# Patient Record
Sex: MALE | Race: WHITE | NOT HISPANIC OR LATINO | Employment: FULL TIME | URBAN - METROPOLITAN AREA
[De-identification: names, ages, dates, MRNs, and addresses within clinical notes are randomized per-mention and may not be internally consistent; named-entity substitution may affect disease eponyms.]

---

## 2019-08-04 ENCOUNTER — HOSPITAL ENCOUNTER (EMERGENCY)
Facility: HOSPITAL | Age: 24
Discharge: HOME | End: 2019-08-04
Attending: EMERGENCY MEDICINE

## 2019-08-04 VITALS
HEART RATE: 58 BPM | WEIGHT: 135 LBS | DIASTOLIC BLOOD PRESSURE: 86 MMHG | TEMPERATURE: 98 F | HEIGHT: 69 IN | RESPIRATION RATE: 20 BRPM | SYSTOLIC BLOOD PRESSURE: 123 MMHG | BODY MASS INDEX: 19.99 KG/M2 | OXYGEN SATURATION: 100 %

## 2019-08-04 DIAGNOSIS — K04.01 TOOTH PULPITIS: Primary | ICD-10-CM

## 2019-08-04 PROCEDURE — 25000003 PHARM REV CODE 250: Performed by: NURSE PRACTITIONER

## 2019-08-04 PROCEDURE — 99284 EMERGENCY DEPT VISIT MOD MDM: CPT

## 2019-08-04 RX ORDER — IBUPROFEN 800 MG/1
800 TABLET ORAL EVERY 8 HOURS PRN
Qty: 20 TABLET | Refills: 0 | Status: SHIPPED | OUTPATIENT
Start: 2019-08-04

## 2019-08-04 RX ORDER — HYDROCODONE BITARTRATE AND ACETAMINOPHEN 5; 325 MG/1; MG/1
1 TABLET ORAL
Status: COMPLETED | OUTPATIENT
Start: 2019-08-04 | End: 2019-08-04

## 2019-08-04 RX ORDER — CLINDAMYCIN HYDROCHLORIDE 150 MG/1
300 CAPSULE ORAL 4 TIMES DAILY
Qty: 56 CAPSULE | Refills: 0 | Status: SHIPPED | OUTPATIENT
Start: 2019-08-04 | End: 2019-08-11

## 2019-08-04 RX ADMIN — HYDROCODONE BITARTRATE AND ACETAMINOPHEN 1 TABLET: 5; 325 TABLET ORAL at 12:08

## 2019-08-04 NOTE — DISCHARGE INSTRUCTIONS
Take all antibiotics as prescribed. Follow up with dentist as soon as possible. Return to ED for new or worsening symptoms.

## 2019-08-04 NOTE — ED PROVIDER NOTES
Encounter Date: 8/4/2019    SCRIBE #1 NOTE: I, Skinny Galeasbonnie, am scribing for, and in the presence of,  Billie Mckeon NP. I have scribed the entire note.       History     Chief Complaint   Patient presents with    Dental Pain     Time patient was seen by the provider: 12:31 PM      The patient is a 24 y.o. male with no known co-morbidities, who presents to the ED with a complaint of dental pain. Patient reports that he has had a toothache for the past couple months which just yesterday became more severe prompting him to present to the ED. Patient reports he has been meaning to get in and see his dentist but has been unable and cannot bear the pain. NKDA. Patient endorses smoking and the occasional use of Marijuana. Patient denies any other acute symptoms at this time.          Review of patient's allergies indicates:  No Known Allergies  History reviewed. No pertinent past medical history.  Past Surgical History:   Procedure Laterality Date    TONSILLECTOMY       History reviewed. No pertinent family history.  Social History     Tobacco Use    Smoking status: Current Every Day Smoker     Packs/day: 1.00     Types: Cigarettes    Smokeless tobacco: Never Used   Substance Use Topics    Alcohol use: Never     Frequency: Never    Drug use: Yes     Types: Marijuana     Review of Systems   Constitutional: Negative for activity change, appetite change, chills and fever.   HENT: Positive for dental problem. Negative for congestion, drooling, ear pain, facial swelling, rhinorrhea, sinus pressure, sinus pain, sneezing, sore throat and voice change.    Eyes: Negative for pain, discharge and redness.   Respiratory: Negative for cough, shortness of breath, wheezing and stridor.    Cardiovascular: Negative for chest pain, palpitations and leg swelling.   Gastrointestinal: Negative for abdominal distention, abdominal pain, blood in stool, constipation, diarrhea, nausea and vomiting.   Genitourinary: Negative for difficulty  urinating, dysuria, flank pain, frequency, hematuria and urgency.   Musculoskeletal: Negative for arthralgias, gait problem, joint swelling and myalgias.   Skin: Negative for rash and wound.   Neurological: Negative for dizziness, syncope, facial asymmetry, speech difficulty, weakness, light-headedness, numbness and headaches.   Hematological: Negative for adenopathy.   Psychiatric/Behavioral: Negative.    All other systems reviewed and are negative.      Physical Exam     Initial Vitals [08/04/19 1222]   BP Pulse Resp Temp SpO2   123/86 (!) 58 20 97.6 °F (36.4 °C) 100 %      MAP       --         Physical Exam    Nursing note and vitals reviewed.  Constitutional: He appears well-developed and well-nourished. He is not diaphoretic. He is active. No distress.   HENT:   Head: Normocephalic and atraumatic.   Right Ear: External ear normal.   Left Ear: External ear normal.   Nose: Nose normal.   Mouth/Throat: Uvula is midline, oropharynx is clear and moist and mucous membranes are normal. No oral lesions. Abnormal dentition. Dental caries present. No dental abscesses. No oropharyngeal exudate.   No palpable dental abscess.    Eyes: Conjunctivae, EOM and lids are normal. Pupils are equal, round, and reactive to light. Right eye exhibits no chemosis and no discharge. Left eye exhibits no chemosis and no discharge. Right conjunctiva is not injected. Left conjunctiva is not injected.   Neck: Trachea normal and normal range of motion. Neck supple. No stridor present. No tracheal deviation present. No neck rigidity.   Cardiovascular: Normal rate, regular rhythm, normal heart sounds and normal pulses. Exam reveals no distant heart sounds and no friction rub.    No murmur heard.  Pulmonary/Chest: Breath sounds normal. No stridor. He has no wheezes. He has no rhonchi. He has no rales.   Musculoskeletal: Normal range of motion.   Neurological: He is alert and oriented to person, place, and time. He has normal strength. GCS eye  subscore is 4. GCS verbal subscore is 5. GCS motor subscore is 6.   Skin: Skin is warm, dry and intact. Capillary refill takes less than 2 seconds. No rash noted.   Psychiatric: He has a normal mood and affect. His speech is normal and behavior is normal. Thought content normal.         ED Course   Procedures  Labs Reviewed - No data to display       Imaging Results    None          Medical Decision Making:   History:   Old Medical Records: I decided to obtain old medical records.  Differential Diagnosis:   Pulpitis  Abscess  Facial cellulitis  parotiditis        APC / Resident Notes:   The patient appears to have pulpitis.  Based upon the history and physical I see no signs of Samson's angina, sublingual swelling, facial swelling, airway compromise, facial cellulitis, sepsis, dehydration, or a fluctuant abscess to drain.  I believe the patient can be discharged home with antibiotics  And anti-inflammatories.  The patient has been given specific return precautions and instructed to follow up with a dentist. I have discussed pt with Dr Mcmillan who agrees with poc.Pt  voices understanding and is agreeable to the plan.  He is given specific return precautions.            Scribe Attestation:   Scribe #1: I performed the above scribed service and the documentation accurately describes the services I performed. I attest to the accuracy of the note.    I, WILEY Park, personally performed the services described in this documentation. All medical record entries made by the scribe were at my direction and in my presence.  I have reviewed the chart and agree that the record reflects my personal performance and is accurate and complete. WILEY Park.  2:52 PM 08/04/2019             Clinical Impression:       ICD-10-CM ICD-9-CM   1. Tooth pulpitis K04.01 522.0         Disposition:   Disposition: Discharged  Condition: Stable                        Billie Mckeon NP  08/04/19 1556

## 2022-06-13 ENCOUNTER — HOSPITAL ENCOUNTER (EMERGENCY)
Facility: HOSPITAL | Age: 27
Discharge: HOME | End: 2022-06-13
Attending: EMERGENCY MEDICINE
Payer: MEDICAID

## 2022-06-13 VITALS
TEMPERATURE: 98 F | SYSTOLIC BLOOD PRESSURE: 117 MMHG | DIASTOLIC BLOOD PRESSURE: 56 MMHG | BODY MASS INDEX: 19.26 KG/M2 | RESPIRATION RATE: 18 BRPM | HEIGHT: 69 IN | WEIGHT: 130 LBS | HEART RATE: 62 BPM | OXYGEN SATURATION: 99 %

## 2022-06-13 DIAGNOSIS — F11.93 OPIATE WITHDRAWAL: Primary | ICD-10-CM

## 2022-06-13 PROCEDURE — 96374 THER/PROPH/DIAG INJ IV PUSH: CPT

## 2022-06-13 PROCEDURE — 96361 HYDRATE IV INFUSION ADD-ON: CPT

## 2022-06-13 PROCEDURE — 25000003 PHARM REV CODE 250: Performed by: EMERGENCY MEDICINE

## 2022-06-13 PROCEDURE — 96376 TX/PRO/DX INJ SAME DRUG ADON: CPT

## 2022-06-13 PROCEDURE — 63600175 PHARM REV CODE 636 W HCPCS: Performed by: EMERGENCY MEDICINE

## 2022-06-13 PROCEDURE — 96375 TX/PRO/DX INJ NEW DRUG ADDON: CPT

## 2022-06-13 PROCEDURE — 99284 EMERGENCY DEPT VISIT MOD MDM: CPT | Mod: 25

## 2022-06-13 RX ORDER — CLONIDINE HYDROCHLORIDE 0.1 MG/1
0.1 TABLET ORAL
Status: COMPLETED | OUTPATIENT
Start: 2022-06-13 | End: 2022-06-13

## 2022-06-13 RX ORDER — PAROXETINE HYDROCHLORIDE 40 MG/1
40 TABLET, FILM COATED ORAL EVERY MORNING
COMMUNITY
Start: 2022-04-13

## 2022-06-13 RX ORDER — DIAZEPAM 10 MG/2ML
5 INJECTION INTRAMUSCULAR
Status: COMPLETED | OUTPATIENT
Start: 2022-06-13 | End: 2022-06-13

## 2022-06-13 RX ORDER — CLONIDINE HYDROCHLORIDE 0.1 MG/1
0.1 TABLET ORAL 2 TIMES DAILY
Qty: 10 TABLET | Refills: 0 | Status: SHIPPED | OUTPATIENT
Start: 2022-06-13 | End: 2023-07-14 | Stop reason: SDUPTHER

## 2022-06-13 RX ORDER — SODIUM CHLORIDE 9 MG/ML
INJECTION, SOLUTION INTRAVENOUS
Status: COMPLETED | OUTPATIENT
Start: 2022-06-13 | End: 2022-06-13

## 2022-06-13 RX ORDER — CLONAZEPAM 2 MG/1
2 TABLET ORAL 3 TIMES DAILY PRN
Qty: 15 TABLET | Refills: 0 | Status: SHIPPED | OUTPATIENT
Start: 2022-06-13

## 2022-06-13 RX ORDER — KETOROLAC TROMETHAMINE 30 MG/ML
30 INJECTION, SOLUTION INTRAMUSCULAR; INTRAVENOUS
Status: COMPLETED | OUTPATIENT
Start: 2022-06-13 | End: 2022-06-13

## 2022-06-13 RX ORDER — CLONAZEPAM 0.5 MG/1
2 TABLET ORAL
Status: COMPLETED | OUTPATIENT
Start: 2022-06-13 | End: 2022-06-13

## 2022-06-13 RX ADMIN — SODIUM CHLORIDE: 0.9 INJECTION, SOLUTION INTRAVENOUS at 05:06

## 2022-06-13 RX ADMIN — DIAZEPAM 5 MG: 5 INJECTION, SOLUTION INTRAMUSCULAR; INTRAVENOUS at 06:06

## 2022-06-13 RX ADMIN — CLONIDINE HYDROCHLORIDE 0.1 MG: 0.1 TABLET ORAL at 05:06

## 2022-06-13 RX ADMIN — DIAZEPAM 5 MG: 5 INJECTION, SOLUTION INTRAMUSCULAR; INTRAVENOUS at 07:06

## 2022-06-13 RX ADMIN — CLONAZEPAM 2 MG: 0.5 TABLET ORAL at 05:06

## 2022-06-13 RX ADMIN — KETOROLAC TROMETHAMINE 30 MG: 30 INJECTION, SOLUTION INTRAMUSCULAR at 06:06

## 2022-06-13 NOTE — ED PROVIDER NOTES
Encounter Date: 6/13/2022       History     Chief Complaint   Patient presents with    Withdrawal     Body aches and can't sleep     Chief complaint:  Withdrawn    HPI:  27-year-old male who reports daily oxycodone usage presents with withdrawal.  He describes feeling anxious and jittery, muscle aches.  He reports that he snorts oxycodone.  He denies any intravenous use.  He has no headache or neck stiffness.  No chest pain or shortness of breath but does have palpitations.  He has no significant past medical history.        Review of patient's allergies indicates:  No Known Allergies  History reviewed. No pertinent past medical history.  Past Surgical History:   Procedure Laterality Date    TONSILLECTOMY       History reviewed. No pertinent family history.  Social History     Tobacco Use    Smoking status: Current Every Day Smoker     Packs/day: 1.00     Types: Cigarettes    Smokeless tobacco: Never Used   Substance Use Topics    Alcohol use: Never    Drug use: Yes     Types: Marijuana     Comment: danilo     Review of Systems   Constitutional: Negative for activity change, appetite change, chills, fatigue and fever.   Eyes: Negative for visual disturbance.   Respiratory: Negative for apnea and shortness of breath.    Cardiovascular: Positive for palpitations. Negative for chest pain.   Gastrointestinal: Negative for abdominal distention and abdominal pain.   Genitourinary: Negative for difficulty urinating.   Musculoskeletal: Positive for myalgias. Negative for neck pain.   Skin: Negative for pallor and rash.   Neurological: Negative for headaches.   Hematological: Does not bruise/bleed easily.   Psychiatric/Behavioral: Positive for agitation and dysphoric mood. Negative for suicidal ideas. The patient is nervous/anxious and is hyperactive.        Physical Exam     Initial Vitals [06/13/22 0506]   BP Pulse Resp Temp SpO2   (!) 141/86 98 18 97.7 °F (36.5 °C) 98 %      MAP       --         Physical  Exam    Nursing note and vitals reviewed.  Constitutional: He appears well-developed and well-nourished.   HENT:   Head: Normocephalic and atraumatic.   Eyes: Conjunctivae are normal.   Neck: Neck supple.   Normal range of motion.  Cardiovascular: Regular rhythm and normal heart sounds. Exam reveals no gallop and no friction rub.    No murmur heard.  Tachycardic rate   Pulmonary/Chest: Breath sounds normal. No respiratory distress. He has no wheezes. He has no rhonchi. He has no rales.   Abdominal: Abdomen is soft. He exhibits no distension. There is no abdominal tenderness.   Musculoskeletal:         General: Normal range of motion.      Cervical back: Normal range of motion and neck supple.     Neurological: He is alert and oriented to person, place, and time.   Skin: Skin is warm and dry.   Psychiatric:   Anxious with constant movements         ED Course   Procedures  Labs Reviewed - No data to display       Imaging Results    None          Medications   0.9%  NaCl infusion (0 mL/hr Intravenous Stopped 6/13/22 0624)   cloNIDine tablet 0.1 mg (0.1 mg Oral Given 6/13/22 0549)   clonazePAM tablet 2 mg (2 mg Oral Given 6/13/22 0550)   ketorolac injection 30 mg (30 mg Intravenous Given 6/13/22 0602)   diazePAM injection 5 mg (5 mg Intravenous Given 6/13/22 0624)   diazePAM injection 5 mg (5 mg Intravenous Given 6/13/22 0713)     Medical Decision Making:   ED Management:  27-year-old male who abruptly continued opiate usage presents with myalgias nausea vomiting and a general sense of feeling unwell.  He is treated with clonazepam and clonidine and encouraged to follow up with [facility]                      Clinical Impression:   Final diagnoses:  [F11.23] Opiate withdrawal (Primary)          ED Disposition Condition    Discharge Stable        ED Prescriptions     Medication Sig Dispense Start Date End Date Auth. Provider    cloNIDine (CATAPRES) 0.1 MG tablet Take 1 tablet (0.1 mg total) by mouth 2 (two)  times daily. 10 tablet 6/13/2022 6/13/2023 Norbert Rodriguez III, MD    clonazePAM (KLONOPIN) 2 MG Tab Take 1 tablet (2 mg total) by mouth 3 (three) times daily as needed (withdrawal). 15 tablet 6/13/2022  Norbert Rodriguez III, MD        Follow-up Information     Follow up With Specialties Details Why Contact Info      In 1 day  [facility] [address] [facility] [address]  Kaylin Hercules  [phone]           Norbert Rodriguez III, MD  06/15/22 7612

## 2022-06-13 NOTE — ED NOTES
RFA IV removed per policy, Catheter intact. AAOx4, ABC's intact, NADN. D/C instructions and Rx in pt possession along with belongings. No other needs at this time.Pt ambulatory to ED Lobby without assistance, steady gait.

## 2022-06-13 NOTE — ED NOTES
Assumed care from:  LIUDMILA Foy:  Cayetano Noyola is asleep, skin warm and dry, in NAD. Waiting for medication time before discharge. Vitals stable. Abc's intact.

## 2023-07-14 ENCOUNTER — HOSPITAL ENCOUNTER (EMERGENCY)
Facility: HOSPITAL | Age: 28
Discharge: HOME | End: 2023-07-14
Attending: EMERGENCY MEDICINE
Payer: MEDICAID

## 2023-07-14 VITALS
SYSTOLIC BLOOD PRESSURE: 125 MMHG | OXYGEN SATURATION: 100 % | RESPIRATION RATE: 18 BRPM | HEART RATE: 63 BPM | BODY MASS INDEX: 18.51 KG/M2 | DIASTOLIC BLOOD PRESSURE: 78 MMHG | WEIGHT: 125 LBS | HEIGHT: 69 IN | TEMPERATURE: 98 F

## 2023-07-14 DIAGNOSIS — F11.93 OPIATE WITHDRAWAL: Primary | ICD-10-CM

## 2023-07-14 LAB
ANION GAP SERPL CALC-SCNC: 11 MMOL/L (ref 8–16)
BUN SERPL-MCNC: 12 MG/DL (ref 6–20)
CALCIUM SERPL-MCNC: 9 MG/DL (ref 8.7–10.5)
CHLORIDE SERPL-SCNC: 107 MMOL/L (ref 95–110)
CO2 SERPL-SCNC: 22 MMOL/L (ref 23–29)
CREAT SERPL-MCNC: 0.9 MG/DL (ref 0.5–1.4)
EST. GFR  (NO RACE VARIABLE): >60 ML/MIN/1.73 M^2
GLUCOSE SERPL-MCNC: 93 MG/DL (ref 70–110)
POTASSIUM SERPL-SCNC: 4.1 MMOL/L (ref 3.5–5.1)
SODIUM SERPL-SCNC: 140 MMOL/L (ref 136–145)

## 2023-07-14 PROCEDURE — 36415 COLL VENOUS BLD VENIPUNCTURE: CPT | Performed by: EMERGENCY MEDICINE

## 2023-07-14 PROCEDURE — 80048 BASIC METABOLIC PNL TOTAL CA: CPT | Performed by: EMERGENCY MEDICINE

## 2023-07-14 PROCEDURE — 96360 HYDRATION IV INFUSION INIT: CPT

## 2023-07-14 PROCEDURE — 63600175 PHARM REV CODE 636 W HCPCS: Performed by: EMERGENCY MEDICINE

## 2023-07-14 PROCEDURE — 99284 EMERGENCY DEPT VISIT MOD MDM: CPT | Mod: 25

## 2023-07-14 RX ORDER — GABAPENTIN 300 MG/1
300 CAPSULE ORAL 3 TIMES DAILY
Qty: 30 CAPSULE | Refills: 0 | Status: SHIPPED | OUTPATIENT
Start: 2023-07-14 | End: 2024-07-13

## 2023-07-14 RX ORDER — ONDANSETRON 4 MG/1
4 TABLET, ORALLY DISINTEGRATING ORAL EVERY 8 HOURS PRN
Qty: 12 TABLET | Refills: 0 | Status: SHIPPED | OUTPATIENT
Start: 2023-07-14

## 2023-07-14 RX ORDER — CLONIDINE HYDROCHLORIDE 0.1 MG/1
0.1 TABLET ORAL 2 TIMES DAILY
Qty: 10 TABLET | Refills: 0 | Status: SHIPPED | OUTPATIENT
Start: 2023-07-14 | End: 2024-07-13

## 2023-07-14 RX ADMIN — SODIUM CHLORIDE, POTASSIUM CHLORIDE, SODIUM LACTATE AND CALCIUM CHLORIDE 1000 ML: 600; 310; 30; 20 INJECTION, SOLUTION INTRAVENOUS at 04:07

## 2023-07-14 NOTE — ED NOTES
Pt states he was in rehab on July 4th for detox. He has been home for 4 days and is weak, unable to eat and intake fluids. He wants to continue to detox but is fear he may have seizure or become critically ill. Doctor at bedside and speaking  to pt about options for treatment and providing reassurance of condition. PT is very receptive to doctors recommendations.

## 2023-07-14 NOTE — DISCHARGE INSTRUCTIONS
You have opiate withdrawal.  Hopefully your symptoms will resolve in the next few days.  If you can not tolerate it I would suggest taking your Suboxone.  Do not go back using opiates.  If you start Suboxone and you need a further prescription go to the physician listed in Patty Gill, here in [facility], or if absolutely necessary you may have to return to the ER.

## 2023-07-14 NOTE — ED PROVIDER NOTES
Encounter Date: 7/14/2023       History     Chief Complaint   Patient presents with    Extremity Weakness     Pt finished a 72 hour detox on 7/10.  Pt became extremity weak last night 0130hrs.  Pt is also not able to sleep.       Patient is a 28-year-old male who presents the emergency room with opiate withdrawal.  Ten days ago he was admitted to a rehab facility after he was snorting a proximally 25 pills of fentanyl a day.  When it rehab he was given Suboxone and then left after 7 day stay.  Patient does not want to take Suboxone.  When he left rehab he did not take any Suboxone or clonidine.  He simply wants to be off of all medications but does not understand why he feels this bad.  He has some nausea but no vomiting.  He has no fevers.  He denies any abdominal pain.  He has mild diarrhea.  He is not suicidal or homicidal    Review of patient's allergies indicates:  No Known Allergies  No past medical history on file.  Past Surgical History:   Procedure Laterality Date    TONSILLECTOMY       No family history on file.  Social History     Tobacco Use    Smoking status: Every Day     Packs/day: 1.00     Types: Cigarettes    Smokeless tobacco: Never   Substance Use Topics    Alcohol use: Never    Drug use: Yes     Types: Marijuana     Comment: danilo     Review of Systems   Constitutional:  Negative for fever.   HENT:  Negative for ear pain, rhinorrhea and sore throat.    Eyes:  Negative for pain and visual disturbance.   Respiratory:  Negative for cough and shortness of breath.    Cardiovascular:  Negative for chest pain.   Gastrointestinal:  Positive for diarrhea, nausea and vomiting. Negative for abdominal pain.   Genitourinary:  Negative for difficulty urinating.   Musculoskeletal:  Positive for arthralgias and myalgias.   Skin:  Negative for rash.   Neurological:  Negative for weakness, numbness and headaches.   Psychiatric/Behavioral:  Positive for agitation. Negative for confusion, decreased concentration,  dysphoric mood, hallucinations and suicidal ideas. The patient is nervous/anxious.    All other systems reviewed and are negative.    Physical Exam     Initial Vitals [07/14/23 1556]   BP Pulse Resp Temp SpO2   122/74 87 18 98.2 °F (36.8 °C) 98 %      MAP       --         Physical Exam    Nursing note and vitals reviewed.  Constitutional: He appears well-developed.   Malnourished appearing   HENT:   Head: Normocephalic and atraumatic.   Mouth/Throat: Oropharynx is clear and moist.   Eyes: Conjunctivae and EOM are normal. Pupils are equal, round, and reactive to light.   Neck: Neck supple.   Cardiovascular:  Normal rate, regular rhythm, normal heart sounds and intact distal pulses.     Exam reveals no gallop and no friction rub.       No murmur heard.  Pulmonary/Chest: Breath sounds normal. He has no wheezes. He has no rhonchi. He has no rales.   Abdominal: Abdomen is soft. Bowel sounds are normal. There is no abdominal tenderness.   Musculoskeletal:         General: Normal range of motion.      Cervical back: Neck supple.     Neurological: He is alert and oriented to person, place, and time. He has normal strength. No sensory deficit.   Skin: Skin is warm and dry.   Multiple tattoos   Psychiatric:   Slightly anxious but not delirious.  No dysphoric mood.  No hallucinations or delusions.       ED Course   Procedures  Labs Reviewed   BASIC METABOLIC PANEL - Abnormal; Notable for the following components:       Result Value    CO2 22 (*)     All other components within normal limits          Imaging Results    None          Medications   lactated ringers bolus 1,000 mL (1,000 mLs Intravenous New Bag 7/14/23 6927)                              Clinical Impression:   Final diagnoses:  [F11.93] Opiate withdrawal (Primary)        ED Disposition Condition    Discharge Stable          ED Prescriptions       Medication Sig Dispense Start Date End Date Auth. Provider    cloNIDine (CATAPRES) 0.1 MG tablet Take 1 tablet (0.1 mg  total) by mouth 2 (two) times daily. 10 tablet 7/14/2023 7/13/2024 Momo Mancilla MD    ondansetron (ZOFRAN-ODT) 4 MG TbDL Take 1 tablet (4 mg total) by mouth every 8 (eight) hours as needed. 12 tablet 7/14/2023 -- Momo Mancilla MD    gabapentin (NEURONTIN) 300 MG capsule Take 1 capsule (300 mg total) by mouth 3 (three) times daily. 30 capsule 7/14/2023 7/13/2024 Momo Mancilla MD          Follow-up Information       Follow up With Specialties Details Why Contact Info    [facility], Psychiatry Schedule an appointment as soon as possible for a visit   [address] [place] [id number]  [phone]               Momo Mancilla MD  07/14/23 3433

## 2024-08-14 ENCOUNTER — HOSPITAL ENCOUNTER (EMERGENCY)
Facility: HOSPITAL | Age: 29
Discharge: HOME | End: 2024-08-14
Attending: EMERGENCY MEDICINE
Payer: MEDICAID

## 2024-08-14 VITALS
SYSTOLIC BLOOD PRESSURE: 128 MMHG | HEART RATE: 64 BPM | DIASTOLIC BLOOD PRESSURE: 80 MMHG | WEIGHT: 130 LBS | HEIGHT: 70 IN | RESPIRATION RATE: 17 BRPM | TEMPERATURE: 99 F | BODY MASS INDEX: 18.61 KG/M2 | OXYGEN SATURATION: 99 %

## 2024-08-14 DIAGNOSIS — S05.02XA ABRASION OF LEFT CORNEA, INITIAL ENCOUNTER: Primary | ICD-10-CM

## 2024-08-14 PROCEDURE — 25000003 PHARM REV CODE 250: Performed by: EMERGENCY MEDICINE

## 2024-08-14 PROCEDURE — 99284 EMERGENCY DEPT VISIT MOD MDM: CPT

## 2024-08-14 RX ORDER — GENTAMICIN SULFATE 3 MG/ML
2 SOLUTION/ DROPS OPHTHALMIC EVERY 4 HOURS
Qty: 15 ML | Refills: 0 | Status: SHIPPED | OUTPATIENT
Start: 2024-08-14

## 2024-08-14 RX ORDER — PROPARACAINE HYDROCHLORIDE 5 MG/ML
2 SOLUTION/ DROPS OPHTHALMIC
Status: COMPLETED | OUTPATIENT
Start: 2024-08-14 | End: 2024-08-14

## 2024-08-14 RX ORDER — DICLOFENAC SODIUM 50 MG/1
50 TABLET, DELAYED RELEASE ORAL 3 TIMES DAILY PRN
Qty: 15 TABLET | Refills: 0 | Status: SHIPPED | OUTPATIENT
Start: 2024-08-14

## 2024-08-14 RX ADMIN — FLUORESCEIN SODIUM 1 EACH: 1 STRIP OPHTHALMIC at 07:08

## 2024-08-14 RX ADMIN — PROPARACAINE HYDROCHLORIDE 2 DROP: 5 SOLUTION/ DROPS OPHTHALMIC at 07:08

## 2024-08-15 NOTE — ED PROVIDER NOTES
Encounter Date: 8/14/2024       History     Chief Complaint   Patient presents with    Eye Injury     Patient states something got in left eye at work. Works in construction.      29-year-old male presents for evaluation of left eye pain.  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 this afternoon.  He reports irritation of the eye that he has been rubbing it.  He reports that he also rinsed his eye with a bottle of water prior to arrival.  He denies any visual changes, photophobia, or drainage from the eye.  He denies any other injuries or right eye pain.  There are no alleviating factors.      Review of patient's allergies indicates:  No Known Allergies  No past medical history on file.  Past Surgical History:   Procedure Laterality Date    TONSILLECTOMY       No family history on file.  Social History     Tobacco Use    Smoking status: Every Day     Current packs/day: 1.00     Types: Cigarettes    Smokeless tobacco: Never   Substance Use Topics    Alcohol use: Never    Drug use: Yes     Types: Marijuana     Comment: danilo     Review of Systems   Constitutional:  Negative for chills and fever.   HENT:  Negative for congestion.    Eyes:  Positive for pain.   Respiratory:  Negative for cough and shortness of breath.    Cardiovascular:  Negative for chest pain.   Gastrointestinal:  Negative for abdominal pain, nausea and vomiting.   Genitourinary:  Negative for dysuria and testicular pain.   Musculoskeletal:  Negative for back pain.   Skin:  Negative for color change.       Physical Exam     Initial Vitals [08/14/24 1855]   BP Pulse Resp Temp SpO2   128/83 69 18 99.3 °F (37.4 °C) 98 %      MAP       --         Physical Exam    Nursing note and vitals reviewed.  Constitutional: He appears well-developed and well-nourished.   HENT:   Head: Normocephalic and atraumatic.   Eyes: EOM and lids are normal. Pupils are equal, round, and reactive to light. Lids are everted and  swept, no foreign bodies found. Left eye exhibits no discharge, no exudate and no hordeolum. No foreign body present in the left eye. Left conjunctiva is injected. Left conjunctiva has no hemorrhage.   Slit lamp exam:       The left eye shows corneal abrasion and fluorescein uptake. The left eye shows no corneal flare, no corneal ulcer and no foreign body.       Injected, reddened conjunctiva noted to the left eye.  Periorbital redness noted around the left eye.   Neck: Neck supple.   Musculoskeletal:      Cervical back: Neck supple.     Neurological: He is alert and oriented to person, place, and time.   Skin: Skin is warm and dry.   Psychiatric: He has a normal mood and affect.         ED Course   Procedures  Labs Reviewed - No data to display       Imaging Results    None          Medications   proparacaine 0.5 % ophthalmic solution 2 drop (2 drops Left Eye Given 8/14/24 1934)   fluorescein ophthalmic strip 1 each (1 each Left Eye Given 8/14/24 1935)     Medical Decision Making  29-year-old male presents for an eye complaint.    Initial differential diagnosis included but not limited to foreign body, corneal abrasion, and corneal laceration.    Risk  Prescription drug management.  Risk Details: The patient was emergently evaluated in the emergency department his evaluation was significant for a well-appearing young male with redness noted to his left eye.  On eye exam the patient has a corneal abrasion noted to the eye.  There was no evidence of foreign body noted.  The patient is stable for discharge to home and does not require further care or workup at this time.  He will be discharged to home with gentamicin eyedrops and p.o. diclofenac.  He is referred to Ophthalmology for follow-up.                                      Clinical Impression:  Final diagnoses:  [S05.02XA] Abrasion of left cornea, initial encounter (Primary)          ED Disposition Condition    Discharge Stable          ED Prescriptions        Medication Sig Dispense Start Date End Date Auth. Provider    gentamicin (GARAMYCIN) 0.3 % ophthalmic solution Place 2 drops into the left eye every 4 (four) hours. 15 mL 8/14/2024 -- Jose Vanegas MD    diclofenac (VOLTAREN) 50 MG EC tablet Take 1 tablet (50 mg total) by mouth 3 (three) times daily as needed (pain). 15 tablet 8/14/2024 -- Jose Vanegas MD          Follow-up Information       Follow up With Specialties Details Why Contact Info    Ector Suazo MD Ophthalmology Schedule an appointment as soon as possible for a visit   [address] [place] [address] [address]  [facility]  [facility] [address]  [phone]               Jose Vanegas MD  08/14/24 2017

## 2024-09-23 ENCOUNTER — HOSPITAL ENCOUNTER (EMERGENCY)
Facility: HOSPITAL | Age: 29
Discharge: HOME | End: 2024-09-23
Attending: EMERGENCY MEDICINE
Payer: MEDICAID

## 2024-09-23 VITALS
BODY MASS INDEX: 18.61 KG/M2 | TEMPERATURE: 98 F | OXYGEN SATURATION: 98 % | HEIGHT: 70 IN | RESPIRATION RATE: 18 BRPM | SYSTOLIC BLOOD PRESSURE: 119 MMHG | DIASTOLIC BLOOD PRESSURE: 82 MMHG | WEIGHT: 130 LBS | HEART RATE: 75 BPM

## 2024-09-23 DIAGNOSIS — G47.00 INSOMNIA, UNSPECIFIED TYPE: Primary | ICD-10-CM

## 2024-09-23 PROCEDURE — 99281 EMR DPT VST MAYX REQ PHY/QHP: CPT

## 2024-09-23 NOTE — ED PROVIDER NOTES
"Encounter Date: 9/23/2024       History     Chief Complaint   Patient presents with    Insomnia     STATES RECENTLY SOBER AND HASN'T BEEN ABLE TO SLEEP IN 3 WEEKS     29-year-old male presents emergency department requesting medication to have sleep.  Patient states that he was addicted to "fentanyl" states he was getting fentanyl off the street he reports that he went to a detox center and was given Suboxone, however he states he did not want to take that.  He states that he took 1 of his family members Seroquel to help him sleep after he had successfully "detox himself off her fentanyl".  He states that the Seroquel was too strong.  Patient has no complaints other than he feels like he is unable to sleep.  The patient has not tried any over-the-counter medications to help him sleep    The history is provided by the patient.     Review of patient's allergies indicates:  No Known Allergies  Past Medical History:   Diagnosis Date    ADHD     Drug use     Insomnia      Past Surgical History:   Procedure Laterality Date    TONSILLECTOMY       No family history on file.  Social History     Tobacco Use    Smoking status: Every Day     Current packs/day: 1.00     Types: Cigarettes    Smokeless tobacco: Never   Substance Use Topics    Alcohol use: Never    Drug use: Yes     Types: Marijuana     Comment: danilo     Review of Systems   Constitutional: Negative.    HENT: Negative.     Respiratory: Negative.     Cardiovascular: Negative.    Gastrointestinal: Negative.    Genitourinary: Negative.    Musculoskeletal: Negative.    Neurological: Negative.    Psychiatric/Behavioral:  Positive for sleep disturbance. Negative for agitation, confusion, hallucinations and self-injury. The patient is not nervous/anxious.    All other systems reviewed and are negative.      Physical Exam     Initial Vitals [09/23/24 1510]   BP Pulse Resp Temp SpO2   (!) 137/97 110 16 98.1 °F (36.7 °C) 100 %      MAP       --         Physical " "Exam    Nursing note and vitals reviewed.  Constitutional: He appears well-developed and well-nourished.   HENT:   Head: Normocephalic.   Eyes: EOM are normal. Pupils are equal, round, and reactive to light.   Cardiovascular:  Normal rate and regular rhythm.           Pulmonary/Chest: Breath sounds normal.   Musculoskeletal:         General: Normal range of motion.     Neurological: He is alert and oriented to person, place, and time. He has normal strength.   Psychiatric: He has a normal mood and affect. His behavior is normal. Judgment and thought content normal.         ED Course   Procedures  Labs Reviewed - No data to display       Imaging Results    None          Medications - No data to display  Medical Decision Making  29-year-old male presents emergency department requesting medication to have sleep.  Patient states that he was addicted to "fentanyl" states he was getting fentanyl off the street he reports that he went to a detox center and was given Suboxone, however he states he did not want to take that.  He states that he took 1 of his family members Seroquel to help him sleep after he had successfully "detox himself off her fentanyl".  He states that the Seroquel was too strong.  Patient has no complaints other than he feels like he is unable to sleep.  The patient has not tried any over-the-counter medications to help him sleep    The history is provided by the patient.     Considerations include but not limited to, withdrawals, insomnia    29-year-old male presents emergency department reports that he weaned himself off of fentanyl that he had been taking off the street he states he has been clean for over a month he states that he is doing well the only complaint that he has is that he is unable to sleep however he has not tried any over-the-counter medications.  Patient was referred back to his mental health provider at [facility] where he states he attends.  He was instructed on the meantime " he can try Benadryl or melatonin he was given some discharge instructions on things that he can try at home to help with sleep in his environment.              Attending Attestation:     Physician Attestation Statement for NP/PA:   I personally made/approved the management plan and take responsibility for the patient management.    Other NP/PA Attestation Additions:    History of Present Illness: 29-year-old male presents for insomnia.    Medical Decision Making: Initial differential diagnosis included but not limited to medication withdrawal, and medication reaction.  I did not examine this patient, but was available for consultation.  I am in agreement with the nurse practitioner's assessment, treatment, and plan of care.                                    Clinical Impression:  Final diagnoses:  [G47.00] Insomnia, unspecified type (Primary)          ED Disposition Condition    Discharge Stable          ED Prescriptions    None       Follow-up Information       Follow up With Specialties Details Why Contact Bertha    [facility]  Schedule an appointment as soon as possible for a visit in 2 days  [address] [facility] [address]  [phone]               Janell Santoro FNP  09/23/24 1754       Jose Vanegas MD  09/23/24 1759

## 2024-09-23 NOTE — DISCHARGE INSTRUCTIONS
Try over-the-counter Benadryl  Try over-the-counter melatonin  Follow up as directed  Return for any concerns  
For information on Fall & Injury Prevention, visit [email or web address]

## 2024-09-23 NOTE — FIRST PROVIDER EVALUATION
Emergency Department TeleTriage Encounter Note      CHIEF COMPLAINT    Chief Complaint   Patient presents with    Insomnia     STATES RECENTLY SOBER AND HASN'T BEEN ABLE TO SLEEP IN 3 WEEKS       VITAL SIGNS   Initial Vitals [09/23/24 1510]   BP Pulse Resp Temp SpO2   (!) 137/97 110 16 98.1 °F (36.7 °C) 100 %      MAP       --            ALLERGIES    Review of patient's allergies indicates:  No Known Allergies    PROVIDER TRIAGE NOTE  Patient presents with complaint of insomnia for the about one month. Stopped taking fentanyl about one month ago. Reports taking trazadone with no improvement.       Phy:   Constitutional: well nourished, well developed, appearing stated age, NAD        Initial orders will be placed and care will be transferred to an alternate provider when patient is roomed for a full evaluation. Any additional orders and the final disposition will be determined by that provider.        ORDERS  Labs Reviewed - No data to display    ED Orders (720h ago, onward)      None              Virtual Visit Note: The provider triage portion of this emergency department evaluation and documentation was performed via [employer], a HIPAA-compliant telemedicine application, in concert with a tele-presenter in the room. A face to face patient evaluation with one of my colleagues will occur once the patient is placed in an emergency department room.      DISCLAIMER: This note was prepared with [employer] ([employer]) voice recognition transcription software. Garbled syntax, mangled pronouns, and other bizarre constructions may be attributed to that software system.

## 2024-10-07 ENCOUNTER — HOSPITAL ENCOUNTER (EMERGENCY)
Facility: HOSPITAL | Age: 29
Discharge: HOME | End: 2024-10-07
Attending: STUDENT IN AN ORGANIZED HEALTH CARE EDUCATION/TRAINING PROGRAM
Payer: MEDICAID

## 2024-10-07 VITALS
TEMPERATURE: 98 F | WEIGHT: 130 LBS | HEIGHT: 70 IN | OXYGEN SATURATION: 98 % | HEART RATE: 84 BPM | SYSTOLIC BLOOD PRESSURE: 140 MMHG | RESPIRATION RATE: 18 BRPM | BODY MASS INDEX: 18.61 KG/M2 | DIASTOLIC BLOOD PRESSURE: 84 MMHG

## 2024-10-07 DIAGNOSIS — R07.9 CHEST PAIN: Primary | ICD-10-CM

## 2024-10-07 LAB
ALBUMIN SERPL BCP-MCNC: 4 G/DL (ref 3.5–5.2)
ALP SERPL-CCNC: 53 U/L (ref 55–135)
ALT SERPL W/O P-5'-P-CCNC: 16 U/L (ref 10–44)
AMPHET+METHAMPHET UR QL: NEGATIVE
ANION GAP SERPL CALC-SCNC: 8 MMOL/L (ref 8–16)
AST SERPL-CCNC: 15 U/L (ref 10–40)
BARBITURATES UR QL SCN>200 NG/ML: NEGATIVE
BASOPHILS # BLD AUTO: 0.01 K/UL (ref 0–0.2)
BASOPHILS NFR BLD: 0.1 % (ref 0–1.9)
BENZODIAZ UR QL SCN>200 NG/ML: NEGATIVE
BILIRUB SERPL-MCNC: 0.4 MG/DL (ref 0.1–1)
BNP SERPL-MCNC: 18 PG/ML (ref 0–99)
BUN SERPL-MCNC: 9 MG/DL (ref 6–20)
BZE UR QL SCN: NEGATIVE
CALCIUM SERPL-MCNC: 7.9 MG/DL (ref 8.7–10.5)
CANNABINOIDS UR QL SCN: ABNORMAL
CHLORIDE SERPL-SCNC: 109 MMOL/L (ref 95–110)
CO2 SERPL-SCNC: 23 MMOL/L (ref 23–29)
CREAT SERPL-MCNC: 0.7 MG/DL (ref 0.5–1.4)
CREAT UR-MCNC: 273 MG/DL (ref 23–375)
DIFFERENTIAL METHOD BLD: ABNORMAL
EOSINOPHIL # BLD AUTO: 0 K/UL (ref 0–0.5)
EOSINOPHIL NFR BLD: 0.1 % (ref 0–8)
ERYTHROCYTE [DISTWIDTH] IN BLOOD BY AUTOMATED COUNT: 12.7 % (ref 11.5–14.5)
EST. GFR  (NO RACE VARIABLE): >60 ML/MIN/1.73 M^2
GLUCOSE SERPL-MCNC: 102 MG/DL (ref 70–110)
HCT VFR BLD AUTO: 36.2 % (ref 40–54)
HGB BLD-MCNC: 12.6 G/DL (ref 14–18)
IMM GRANULOCYTES # BLD AUTO: 0.02 K/UL (ref 0–0.04)
IMM GRANULOCYTES NFR BLD AUTO: 0.2 % (ref 0–0.5)
LIPASE SERPL-CCNC: 18 U/L (ref 4–60)
LYMPHOCYTES # BLD AUTO: 1 K/UL (ref 1–4.8)
LYMPHOCYTES NFR BLD: 10.3 % (ref 18–48)
MCH RBC QN AUTO: 29.9 PG (ref 27–31)
MCHC RBC AUTO-ENTMCNC: 34.8 G/DL (ref 32–36)
MCV RBC AUTO: 86 FL (ref 82–98)
MONOCYTES # BLD AUTO: 0.5 K/UL (ref 0.3–1)
MONOCYTES NFR BLD: 5.2 % (ref 4–15)
NEUTROPHILS # BLD AUTO: 8.2 K/UL (ref 1.8–7.7)
NEUTROPHILS NFR BLD: 84.1 % (ref 38–73)
NRBC BLD-RTO: 0 /100 WBC
OPIATES UR QL SCN: NEGATIVE
PCP UR QL SCN>25 NG/ML: NEGATIVE
PLATELET # BLD AUTO: 241 K/UL (ref 150–450)
PMV BLD AUTO: 9.5 FL (ref 9.2–12.9)
POTASSIUM SERPL-SCNC: 3.2 MMOL/L (ref 3.5–5.1)
PROT SERPL-MCNC: 6.1 G/DL (ref 6–8.4)
RBC # BLD AUTO: 4.21 M/UL (ref 4.6–6.2)
SODIUM SERPL-SCNC: 140 MMOL/L (ref 136–145)
TOXICOLOGY INFORMATION: ABNORMAL
TROPONIN I SERPL HS-MCNC: 3 PG/ML (ref 0–14.9)
WBC # BLD AUTO: 9.77 K/UL (ref 3.9–12.7)

## 2024-10-07 PROCEDURE — 93005 ELECTROCARDIOGRAM TRACING: CPT | Performed by: GENERAL PRACTICE

## 2024-10-07 PROCEDURE — 85025 COMPLETE CBC W/AUTO DIFF WBC: CPT

## 2024-10-07 PROCEDURE — 99285 EMERGENCY DEPT VISIT HI MDM: CPT | Mod: 25

## 2024-10-07 PROCEDURE — 93010 ELECTROCARDIOGRAM REPORT: CPT | Mod: ,,, | Performed by: GENERAL PRACTICE

## 2024-10-07 PROCEDURE — 83880 ASSAY OF NATRIURETIC PEPTIDE: CPT

## 2024-10-07 PROCEDURE — 83690 ASSAY OF LIPASE: CPT

## 2024-10-07 PROCEDURE — 84484 ASSAY OF TROPONIN QUANT: CPT

## 2024-10-07 PROCEDURE — 80307 DRUG TEST PRSMV CHEM ANLYZR: CPT

## 2024-10-07 PROCEDURE — 80053 COMPREHEN METABOLIC PANEL: CPT

## 2024-10-07 RX ORDER — BUPRENORPHINE 2 MG/1
2 TABLET SUBLINGUAL DAILY
COMMUNITY

## 2024-10-07 RX ORDER — QUETIAPINE FUMARATE 300 MG/1
TABLET, FILM COATED ORAL
COMMUNITY

## 2024-10-07 NOTE — ED PROVIDER NOTES
Encounter Date: 10/7/2024       History     Chief Complaint   Patient presents with    Chest Pain     Began 3pm 10/10, Per EMS pt was given 2 Nitro and 325mg aspirin PTA and pt pain now 7/10.      HPI    [age] year male with past medical history of opiate use (last use was approximately 1 month ago, currently on subutex), and insomnia who presents to emergency department from custody with c.o chest pain. Per EMS, pt received 2 sprays of nitro and aspirin 325 mg. Per patient, his chest pain started around 3:00 p.m. prior to chest pain onset, patient was feeling diaphoretic. He attributed his diaphoresis to elevated BP. Patient was given NyQuil for symptomatic control as he also was not able to fall asleep.  He described the chest pain as sharp and nonradiating.  Associated with some nausea but denies any shortness of breath or vision changes.  Patient's last dose of Subutex was yesterday.  Denies any other drug use.  Patient has not had chest pain like this in the past.  No fevers, chills, headache, urinary symptoms or diarrhea.  Review of patient's allergies indicates:  No Known Allergies  Past Medical History:   Diagnosis Date    ADHD     Drug use     Insomnia      Past Surgical History:   Procedure Laterality Date    TONSILLECTOMY       No family history on file.  Social History     Tobacco Use    Smoking status: Every Day     Current packs/day: 1.00     Types: Cigarettes    Smokeless tobacco: Never   Substance Use Topics    Alcohol use: Never    Drug use: Yes     Types: Marijuana     Comment: danilo     Review of Systems  Please see above HPI   Physical Exam     Initial Vitals [10/07/24 1703]   BP Pulse Resp Temp SpO2   130/82 79 18 98.4 °F (36.9 °C) 97 %      MAP       --         Physical Exam    Nursing note and vitals reviewed.  Constitutional: He appears well-developed and well-nourished.   HENT:   Head: Normocephalic and atraumatic.   Eyes: Conjunctivae are normal.   Cardiovascular:  Normal rate and  regular rhythm.           Pulmonary/Chest: Breath sounds normal.   Abdominal: Abdomen is soft. He exhibits no distension. There is abdominal tenderness.   Mild LLQ abdominal tenderness to palpation      Neurological: He is oriented to person, place, and time.   Skin: Skin is warm and dry.         ED Course   Procedures  Labs Reviewed   CBC W/ AUTO DIFFERENTIAL   COMPREHENSIVE METABOLIC PANEL   B-TYPE NATRIURETIC PEPTIDE   DRUG SCREEN PANEL, URINE EMERGENCY   LIPASE   TROPONIN I HIGH SENSITIVITY          Imaging Results    None          Medications - No data to display  Medical Decision Making      [age] year male with past medical history of opiate use (last use was approximately 1 month ago, currently on subutex), and insomnia who presents to emergency department from custody with c.o chest pain.  Vital signs stable.  Afebrile.  On examination, patient does not appear in acute distress.  Cardiopulmonary auscultation within normal limits.  Abdomen soft, with slight tenderness left lower quadrant.  No evidence of bilateral lower extremity edema or tenderness to palpation.  Differential diagnosis includes volume to ACS, URI, pneumonia, arrhythmia, metabolic derangements, withdrawals, dehydration  Medical workup with no evidence of leukocytosis.  Hemoglobin stable.  Electrolytes largely within normal limits.  Lipase 18.  BNP 18.  Initial troponin 3  EKG on my independent review no evidence of ST segment elevations or depressions.  Sinus rhythm.  Chest x-ray on my independent review with no evidence of focal consolidation, pleural effusion or pneumothorax  Low suspicion of disease at this time.  Patient wanting to leave before joint 2nd troponin.  Patient instructed that for completeness of workup, it is beneficial that we retrieve secondary troponin.  Patient persisted that he would like to be discharged at this time because he will be released from custody tonight and will be able back home.  Patient given  strict return precautions and appropriate follow up.  Patient verbalized understanding of plan and final ED disposition. Pt stable for discharge.    Mumtaz Ramey, PGY3                                        Clinical Impression:  Final diagnoses:  [R07.9] Chest pain                 Mumtaz Ramey MD  Resident  10/07/24 5698

## 2024-10-08 NOTE — DISCHARGE INSTRUCTIONS
Discussed biopsy results with patient. Reassurance given. No treatment necessary Please return to Emergency Department for worsening symptoms of fever, chills, chest pain, shortness of breath, nausea, vomiting or inability to tolerate food/fluids    Detail Level: Simple

## 2024-10-11 LAB
OHS QRS DURATION: 82 MS
OHS QTC CALCULATION: 441 MS

## 2025-03-28 ENCOUNTER — HOSPITAL ENCOUNTER (EMERGENCY)
Facility: HOSPITAL | Age: 30
Discharge: HOME | End: 2025-03-28
Attending: EMERGENCY MEDICINE
Payer: MEDICAID

## 2025-03-28 VITALS
HEART RATE: 71 BPM | HEIGHT: 69 IN | SYSTOLIC BLOOD PRESSURE: 113 MMHG | BODY MASS INDEX: 19.26 KG/M2 | RESPIRATION RATE: 18 BRPM | OXYGEN SATURATION: 97 % | DIASTOLIC BLOOD PRESSURE: 73 MMHG | TEMPERATURE: 98 F | WEIGHT: 130 LBS

## 2025-03-28 DIAGNOSIS — F41.0 PANIC ATTACK: ICD-10-CM

## 2025-03-28 DIAGNOSIS — F41.9 ANXIETY: ICD-10-CM

## 2025-03-28 DIAGNOSIS — F11.23 OPIOID DEPENDENCE WITH WITHDRAWAL: Primary | ICD-10-CM

## 2025-03-28 LAB
HCV AB SERPL QL IA: NORMAL
HIV 1+2 AB+HIV1 P24 AG SERPL QL IA: NORMAL

## 2025-03-28 PROCEDURE — 25000003 PHARM REV CODE 250: Performed by: EMERGENCY MEDICINE

## 2025-03-28 PROCEDURE — 99284 EMERGENCY DEPT VISIT MOD MDM: CPT

## 2025-03-28 PROCEDURE — 36415 COLL VENOUS BLD VENIPUNCTURE: CPT | Performed by: EMERGENCY MEDICINE

## 2025-03-28 PROCEDURE — 86803 HEPATITIS C AB TEST: CPT | Performed by: EMERGENCY MEDICINE

## 2025-03-28 PROCEDURE — 87389 HIV-1 AG W/HIV-1&-2 AB AG IA: CPT | Performed by: EMERGENCY MEDICINE

## 2025-03-28 RX ORDER — ONDANSETRON 4 MG/1
4 TABLET, ORALLY DISINTEGRATING ORAL EVERY 8 HOURS PRN
Qty: 15 TABLET | Refills: 0 | Status: SHIPPED | OUTPATIENT
Start: 2025-03-28

## 2025-03-28 RX ORDER — LORAZEPAM 1 MG/1
1 TABLET ORAL EVERY 8 HOURS PRN
Qty: 21 TABLET | Refills: 0 | Status: SHIPPED | OUTPATIENT
Start: 2025-03-28 | End: 2025-04-04

## 2025-03-28 RX ORDER — DICYCLOMINE HYDROCHLORIDE 20 MG/1
20 TABLET ORAL 2 TIMES DAILY
Qty: 14 TABLET | Refills: 0 | Status: SHIPPED | OUTPATIENT
Start: 2025-03-28 | End: 2025-04-04

## 2025-03-28 RX ORDER — LORAZEPAM 1 MG/1
1 TABLET ORAL
Status: COMPLETED | OUTPATIENT
Start: 2025-03-28 | End: 2025-03-28

## 2025-03-28 RX ORDER — IBUPROFEN 400 MG/1
400 TABLET ORAL EVERY 6 HOURS PRN
Qty: 28 TABLET | Refills: 0 | Status: SHIPPED | OUTPATIENT
Start: 2025-03-28 | End: 2025-04-04

## 2025-03-28 RX ORDER — CLONIDINE HYDROCHLORIDE 0.1 MG/1
0.1 TABLET ORAL
Status: COMPLETED | OUTPATIENT
Start: 2025-03-28 | End: 2025-03-28

## 2025-03-28 RX ORDER — CLONIDINE HYDROCHLORIDE 0.1 MG/1
0.1 TABLET ORAL 2 TIMES DAILY
Qty: 10 TABLET | Refills: 0 | Status: SHIPPED | OUTPATIENT
Start: 2025-03-28 | End: 2025-04-02

## 2025-03-28 RX ORDER — ONDANSETRON 4 MG/1
4 TABLET, ORALLY DISINTEGRATING ORAL
Status: COMPLETED | OUTPATIENT
Start: 2025-03-28 | End: 2025-03-28

## 2025-03-28 RX ORDER — IBUPROFEN 400 MG/1
400 TABLET ORAL
Status: COMPLETED | OUTPATIENT
Start: 2025-03-28 | End: 2025-03-28

## 2025-03-28 RX ADMIN — IBUPROFEN 400 MG: 400 TABLET, FILM COATED ORAL at 10:03

## 2025-03-28 RX ADMIN — LORAZEPAM 1 MG: 1 TABLET ORAL at 10:03

## 2025-03-28 RX ADMIN — CLONIDINE HYDROCHLORIDE 0.1 MG: 0.1 TABLET ORAL at 10:03

## 2025-03-28 RX ADMIN — ONDANSETRON 4 MG: 4 TABLET, ORALLY DISINTEGRATING ORAL at 10:03

## 2025-03-29 NOTE — ED PROVIDER NOTES
Encounter Date: 3/28/2025       History     Chief Complaint   Patient presents with    Seizures     Witnessed seizure/syncopal episode yesterday.     Anxiety     Reports having anxiety today from yesterdays seizure.      Emergent evaluation of a 30-year-old male with a history that is of the, ADHD, would pack per day tobacco use, and opiate use disorder with a addiction to fentanyl.  Patient reports he began several months ago taking pain pills due to bad dentition that ended up being fentanyl and he reports he has now been addicted.  He has been trying to stopped taking the fentanyl.  But he has been having severe symptoms of withdrawal.  He had stopped taking the fentanyl 1-2 days ago but yesterday was having severe diaphoresis anxiety inability to sleep for several days had tremulousness and a syncopal episode in which he and family thought he may have developed a seizure.  He has been history of seizure disorder.  Last night he ended up taking fentanyl again to help with his withdrawal side effects.  Today he would like help with opiate withdrawal symptoms and getting into addiction medicine and psychiatry.  He reports worse symptom at this time as his anxiety.  He reports mild diarrhea no nausea vomiting or abdominal pain no headache no current diaphoresis.  He also reports elevated blood pressure readings at home.  No chest pain or shortness of breath  He states he is not suicidal homicidal.  I symptoms has been disabling and he has been showering.  But he has not feel that or rehab for drug use at this time      Review of patient's allergies indicates:  No Known Allergies  Past Medical History:   Diagnosis Date    ADHD     Drug use     Insomnia      Past Surgical History:   Procedure Laterality Date    TONSILLECTOMY       No family history on file.  Social History[1]  Review of Systems   Constitutional:  Positive for activity change, diaphoresis and fatigue. Negative for appetite change, chills and fever.    HENT:  Negative for congestion, postnasal drip and rhinorrhea.    Respiratory:  Negative for cough, chest tightness, shortness of breath and wheezing.    Cardiovascular:  Negative for chest pain and palpitations.   Gastrointestinal:  Positive for diarrhea. Negative for abdominal pain, constipation, nausea and vomiting.   Musculoskeletal:  Negative for neck pain and neck stiffness.   Skin:  Negative for rash.   Neurological:  Negative for dizziness, weakness, light-headedness, numbness and headaches.   Psychiatric/Behavioral:  Positive for sleep disturbance. Negative for agitation, behavioral problems, confusion, decreased concentration, dysphoric mood, self-injury and suicidal ideas. The patient is nervous/anxious and is hyperactive.    All other systems reviewed and are negative.      Physical Exam     Initial Vitals [03/28/25 2057]   BP Pulse Resp Temp SpO2   (!) 138/93 (!) 113 18 98 °F (36.7 °C) 100 %      MAP       --         Physical Exam    Nursing note and vitals reviewed.  Constitutional: He appears well-developed and well-nourished. He is not diaphoretic. No distress.   HENT:   Head: Normocephalic and atraumatic.   Right Ear: External ear normal.   Left Ear: External ear normal.   Nose: Nose normal. Mouth/Throat: Oropharynx is clear and moist.   Eyes: Conjunctivae and EOM are normal. Pupils are equal, round, and reactive to light.   Neck: Neck supple. No tracheal deviation present.   Normal range of motion.  Cardiovascular:  Normal rate, regular rhythm, normal heart sounds and intact distal pulses.     Exam reveals no gallop and no friction rub.       No murmur heard.  138/93 pulse 113   Pulmonary/Chest: Breath sounds normal. No stridor. No respiratory distress. He has no wheezes. He has no rhonchi. He has no rales. He exhibits no tenderness.   Respirations 18 sats 100% on room air clear breath sounds   Abdominal: Abdomen is soft. Bowel sounds are normal. He exhibits no distension and no mass. There is no  abdominal tenderness. There is no rebound and no guarding.   Musculoskeletal:         General: No edema. Normal range of motion.      Cervical back: Normal range of motion and neck supple.     Neurological: He is alert and oriented to person, place, and time. He has normal strength. No cranial nerve deficit or sensory deficit.   Skin: Skin is warm and dry. No rash noted. No erythema. No pallor.   Psychiatric: His behavior is normal. Judgment and thought content normal.   No SI or HI answers questions appropriately appears very anxious about health         ED Course   Procedures  Labs Reviewed   HEPATITIS C ANTIBODY - Normal       Result Value    Hep C Ab Interp Non-Reactive     HIV 1 / 2 ANTIBODY - Normal    HIV 1/2 Ag/Ab Non-Reactive            Imaging Results    None          Medications   ibuprofen tablet 400 mg (400 mg Oral Given 3/28/25 2231)   cloNIDine tablet 0.1 mg (0.1 mg Oral Given 3/28/25 2231)   LORazepam tablet 1 mg (1 mg Oral Given 3/28/25 2231)   ondansetron disintegrating tablet 4 mg (4 mg Oral Given 3/28/25 2232)     Medical Decision Making  Emergent evaluation of a 30-year-old male with a history that is of the, ADHD, would pack per day tobacco use, and opiate use disorder with a addiction to fentanyl.  Patient reports he began several months ago taking pain pills due to bad dentition that ended up being fentanyl and he reports he has now been addicted.  He has been trying to stopped taking the fentanyl.  But he has been having severe symptoms of withdrawal.  He had stopped taking the fentanyl 1-2 days ago but yesterday was having severe diaphoresis anxiety inability to sleep for several days had tremulousness and a syncopal episode in which he and family thought he may have developed a seizure.  He has been history of seizure disorder.  Last night he ended up taking fentanyl again to help with his withdrawal side effects.  Today he would like help with opiate withdrawal symptoms and getting into  addiction medicine and psychiatry.  He reports worse symptom at this time as his anxiety.  He reports mild diarrhea no nausea vomiting or abdominal pain no headache no current diaphoresis.  He also reports elevated blood pressure readings at home.  No chest pain or shortness of breath  He states he is not suicidal homicidal.  I symptoms has been disabling and he has been showering.  But he has not feel that or rehab for drug use at this time.  On exam blood pressure is 138/93 pulse 113 other vitals are normal patient is very anxious.  He is not agitated.  In no distress.  No SI or HI.  No grave disability due to his opiate use disorder with withdrawal symptoms  MDM    Patient presents for emergent evaluation of acute patient reports addiction to fentanyl would like to stop using fentanyl last use last night that has having symptoms of acute withdrawal that poses a threat to life and/or bodily function.   Differential diagnosis includes but was not limited to opiate use disorder with acute withdrawal, hypertension, anxiety, grave disability, depression, suicidal or homicidal ideations, polysubstance abuse.   In the ED patient found to have acute opiate use disorder, with a acute opiate withdrawal, anxiety    Discharge MDM     Patient was managed in the ED with patient prefers oral medications he was therefore treated with  Ativan 1 mg, clonidine 0.1 mg ibuprofen 400 mg Zofran 4 mg he reports he has been feeling improved he reports Ativan has greatly helped his anxiety.  Blood pressure was 113/73 and pulse was 71.  I have done referrals to both addiction medicine as well as [facility] health patient needs a physician for both his anxiety as well as his opiate use disorder.  He reports he had already has a prescription for buprenorphine 8 mg films but he would not want to take this because he was afraid of causing himself to go into acute withdrawal.  I discussed with him that with a short-acting opiates such as fentanyl  he should wait a proximally 24 hours before starting buprenorphine.  Until then he has been given symptomatic medications out through withdrawal including anti-inflammatories Ativan Bentyl Zofran and clonidine  The response to treatment was stable.    Patient was discharged in stable condition.  Detailed return precautions discussed.  Patient was told to follow up with primary care physician or specialist based on their diagnosis  Chantell Kwan MD      Risk  Prescription drug management.                                      Clinical Impression:  Final diagnoses:  [F11.23] Opioid dependence with withdrawal (Primary)  [F41.9] Anxiety  [F41.0] Panic attack          ED Disposition Condition    Discharge Stable          ED Prescriptions       Medication Sig Dispense Start Date End Date Auth. Provider    cloNIDine (CATAPRES) 0.1 MG tablet Take 1 tablet (0.1 mg total) by mouth 2 (two) times daily. for 5 days 10 tablet 3/28/2025 4/2/2025 Chantell Kwan MD    ibuprofen (ADVIL,MOTRIN) 400 MG tablet Take 1 tablet (400 mg total) by mouth every 6 (six) hours as needed for Other (pain). 28 tablet 3/28/2025 4/4/2025 Chantell Kwan MD    LORazepam (ATIVAN) 1 MG tablet Take 1 tablet (1 mg total) by mouth every 8 (eight) hours as needed for Anxiety. 21 tablet 3/28/2025 4/4/2025 Chantell Kwan MD    ondansetron (ZOFRAN-ODT) 4 MG TbDL Take 1 tablet (4 mg total) by mouth every 8 (eight) hours as needed (Nausea and vomiting). 15 tablet 3/28/2025 -- Chantell Kwan MD    dicyclomine (BENTYL) 20 mg tablet Take 1 tablet (20 mg total) by mouth 2 (two) times daily. for 7 days 14 tablet 3/28/2025 4/4/2025 Chantell Kwan MD          Follow-up Information       Follow up With Specialties Details Why Contact Info Additional Information    [facility] -  Emergency Medicine Go to  If symptoms worsen [address] [place] [address] Dr Diana Harmon [phone] 1st floor    [facility]  [facility]  Schedule an appointment as soon as possible for a visit   [address] [address]  [phone]                  [1]   Social History  Tobacco Use    Smoking status: Every Day     Current packs/day: 1.00     Types: Cigarettes    Smokeless tobacco: Never   Substance Use Topics    Alcohol use: Never    Drug use: Yes     Types: Marijuana     Comment: Chantell Martin MD  03/29/25 0457

## 2025-04-29 ENCOUNTER — TELEPHONE (OUTPATIENT)
Dept: PSYCHIATRY | Facility: CLINIC | Age: 30
End: 2025-04-29
Payer: MEDICAID

## 2025-06-03 ENCOUNTER — HOSPITAL ENCOUNTER (EMERGENCY)
Facility: HOSPITAL | Age: 30
Discharge: HOME | End: 2025-06-03
Attending: EMERGENCY MEDICINE
Payer: MEDICAID

## 2025-06-03 VITALS
OXYGEN SATURATION: 96 % | SYSTOLIC BLOOD PRESSURE: 134 MMHG | WEIGHT: 130 LBS | HEART RATE: 117 BPM | HEIGHT: 69 IN | TEMPERATURE: 98 F | DIASTOLIC BLOOD PRESSURE: 60 MMHG | RESPIRATION RATE: 20 BRPM | BODY MASS INDEX: 19.26 KG/M2

## 2025-06-03 DIAGNOSIS — F11.10 OPIOID ABUSE: Primary | ICD-10-CM

## 2025-06-03 PROCEDURE — 63600175 PHARM REV CODE 636 W HCPCS: Performed by: EMERGENCY MEDICINE

## 2025-06-03 PROCEDURE — 99281 EMR DPT VST MAYX REQ PHY/QHP: CPT

## 2025-06-03 RX ORDER — BUPRENORPHINE AND NALOXONE 8; 2 MG/1; MG/1
1 FILM, SOLUBLE BUCCAL; SUBLINGUAL ONCE
Status: DISCONTINUED | OUTPATIENT
Start: 2025-06-03 | End: 2025-06-03 | Stop reason: HOSPADM

## 2025-06-05 ENCOUNTER — HOSPITAL ENCOUNTER (EMERGENCY)
Facility: HOSPITAL | Age: 30
Discharge: HOME | End: 2025-06-05
Attending: EMERGENCY MEDICINE
Payer: MEDICAID

## 2025-06-05 VITALS
TEMPERATURE: 98 F | RESPIRATION RATE: 16 BRPM | BODY MASS INDEX: 18.45 KG/M2 | OXYGEN SATURATION: 99 % | HEIGHT: 69 IN | HEART RATE: 68 BPM | DIASTOLIC BLOOD PRESSURE: 63 MMHG | WEIGHT: 124.56 LBS | SYSTOLIC BLOOD PRESSURE: 126 MMHG

## 2025-06-05 DIAGNOSIS — F19.10 SUBSTANCE ABUSE: Primary | ICD-10-CM

## 2025-06-05 PROCEDURE — 99281 EMR DPT VST MAYX REQ PHY/QHP: CPT

## 2025-06-05 NOTE — ED PROVIDER NOTES
Encounter Date: 6/5/2025       History     Chief Complaint   Patient presents with    Anxiety     Pt going to rehab in am to get off fentanyl and feeling anxious trying to stay off it tonight. Last dose a couple of hours ago.      Patient presents emergency department with reported anxiety over concerns for withdrawal states that he has been using fentanyl multiple times a day he reports that in his last use was this evening he went to [facility] for inpatient substance abuse states that he was told he needs to return in the morning for admission he is concerned that he may develop withdrawal symptoms he does have Subutex at the house does not like taking it he denies any auditory visual hallucinations he denies any nausea vomiting or diarrhea no reported chest pain or shortness of breath patient was seen 2 days prior to this at [facility] with the same symptoms was reportedly going to [facility] the following morning during his last assessment        Review of patient's allergies indicates:  No Known Allergies  Past Medical History:   Diagnosis Date    ADHD     Drug use     Insomnia      Past Surgical History:   Procedure Laterality Date    TONSILLECTOMY       No family history on file.  Social History[1]  Review of Systems   Psychiatric/Behavioral:  Negative for hallucinations, self-injury and sleep disturbance. The patient is nervous/anxious.        Physical Exam     Initial Vitals [06/05/25 0137]   BP Pulse Resp Temp SpO2   122/84 (!) 122 16 97.9 °F (36.6 °C) 97 %      MAP       --         Physical Exam    Constitutional: He appears well-developed and well-nourished. No distress.   HENT:   Head: Normocephalic and atraumatic.   Right Ear: External ear normal.   Left Ear: External ear normal. Mouth/Throat: Oropharynx is clear and moist.   Eyes: Pupils are equal, round, and reactive to light.   Neck: Neck supple.   Normal range of motion.  Cardiovascular:  Normal rate, regular rhythm, S1 normal, S2  normal, normal heart sounds and intact distal pulses.           Pulmonary/Chest: Breath sounds normal.   Abdominal: Abdomen is soft. Bowel sounds are normal. There is no abdominal tenderness.   Musculoskeletal:         General: Normal range of motion.      Cervical back: Normal range of motion and neck supple.     Neurological: He is alert and oriented to person, place, and time. He has normal strength. GCS score is 15. GCS eye subscore is 4. GCS verbal subscore is 5. GCS motor subscore is 6.   Skin: Skin is warm and dry. No rash noted.   Psychiatric: He has a normal mood and affect. His behavior is normal. Judgment normal. His speech is rapid and/or pressured. He is not actively hallucinating. Thought content is not paranoid. Cognition and memory are normal. He expresses no homicidal and no suicidal ideation. He is attentive.         ED Course   Procedures  Labs Reviewed - No data to display       Imaging Results    None          Medications - No data to display  Medical Decision Making  Patient is now requesting discharge states that he is concerned about getting to cuffing him behavioral this morning for assistance with his substance abuse will release patient with recommendations that he go straight to [facility] return to ER for any worsened symptoms or new symptoms                                      Clinical Impression:  Final diagnoses:  [F19.10] Substance abuse (Primary)          ED Disposition Condition    Discharge Stable          ED Prescriptions    None       Follow-up Information       Follow up With Specialties Details Why Contact Info    [facility]  Go today for further evaluation and treatment [address] [address]  [phone]                   [1]   Social History  Tobacco Use    Smoking status: Every Day     Current packs/day: 1.00     Types: Cigarettes    Smokeless tobacco: Never   Substance Use Topics    Alcohol use: Never    Drug  use: Yes     Types: Marijuana, Fentanyl     Comment: Paulie Cummings MD  06/05/25 0601

## 2025-06-05 NOTE — LETTER
Patient: Denzel Noyola  YOB: 1995  Date: 6/5/2025 Time: 4:01 AM  Location: [facility]    Leaving the Hospital Against Medical Advice    Chart #:[id number]    This will certify that I, the undersigned,    ______________________________________________________________________    A patient in the above named medical center, having requested discharge and removal from the medical center against the advice of my attending physician(s), hereby release [facility], its physicians, officers and employees, severally and individually, from any and all liability of any nature whatsoever for any injury or harm or complication of any kind that may result directly or indirectly, by reason of my terminating my stay as a patient at [facility] and my departure from [facility], and hereby waive any and all rights of action I may now have or later acquire as a result of my voluntary departure from [facility] and the termination of my stay as a patient therein.    This release is made with the full knowledge of the danger that may result from the action which I am taking.      Date:_______________________                         ___________________________                                                                                    Patient/Legal Representative    Witness:        ____________________________                          ___________________________  Nurse                                                                        Physician

## 2025-07-16 ENCOUNTER — TELEPHONE (OUTPATIENT)
Dept: PSYCHIATRY | Facility: CLINIC | Age: 30
End: 2025-07-16
Payer: MEDICAID

## 2025-07-16 NOTE — TELEPHONE ENCOUNTER
Rec'd VM at 1100. Pt's girlfriend Jl called to say pt missed his appt because he is in court on the [place]. She wants to reschedule. Attempted to call Jl. No answer. VM full.   
yes